# Patient Record
Sex: MALE | Race: WHITE | Employment: FULL TIME | URBAN - METROPOLITAN AREA
[De-identification: names, ages, dates, MRNs, and addresses within clinical notes are randomized per-mention and may not be internally consistent; named-entity substitution may affect disease eponyms.]

---

## 2023-08-29 PROBLEM — I49.3 PVCS (PREMATURE VENTRICULAR CONTRACTIONS): Status: ACTIVE | Noted: 2023-08-29

## 2023-08-29 PROBLEM — M25.562 LEFT KNEE PAIN: Status: ACTIVE | Noted: 2023-08-29

## 2023-08-29 PROBLEM — I10 HYPERTENSION: Status: ACTIVE | Noted: 2023-08-29

## 2023-08-29 PROBLEM — M19.90 ARTHRITIS: Status: ACTIVE | Noted: 2023-08-29

## 2023-08-29 PROBLEM — K21.9 ACID REFLUX: Status: ACTIVE | Noted: 2023-08-29

## 2023-08-29 PROBLEM — I82.90 VENOUS THROMBOSIS: Status: ACTIVE | Noted: 2023-08-29

## 2023-08-29 PROBLEM — I70.90 ASVD (ARTERIOSCLEROTIC VASCULAR DISEASE): Status: ACTIVE | Noted: 2023-08-29

## 2023-08-29 PROBLEM — C61 PROSTATE CANCER (MULTI): Status: ACTIVE | Noted: 2023-08-29

## 2023-08-29 PROBLEM — E78.5 HYPERLIPIDEMIA: Status: ACTIVE | Noted: 2023-08-29

## 2023-08-29 PROBLEM — R94.31 ABNORMAL EKG: Status: ACTIVE | Noted: 2023-08-29

## 2023-08-29 PROBLEM — M17.0 PRIMARY OSTEOARTHRITIS OF BOTH KNEES: Status: ACTIVE | Noted: 2023-08-29

## 2023-08-29 RX ORDER — FINASTERIDE 5 MG/1
1 TABLET, FILM COATED ORAL DAILY
COMMUNITY

## 2023-08-29 RX ORDER — HYDROCODONE BITARTRATE AND ACETAMINOPHEN 5; 325 MG/1; MG/1
TABLET ORAL EVERY 4 HOURS PRN
COMMUNITY
End: 2024-05-20 | Stop reason: ALTCHOICE

## 2023-08-29 RX ORDER — METOPROLOL TARTRATE 50 MG/1
0.5 TABLET ORAL 2 TIMES DAILY
COMMUNITY
Start: 2019-04-30

## 2023-08-29 RX ORDER — TAMSULOSIN HYDROCHLORIDE 0.4 MG/1
CAPSULE ORAL
COMMUNITY
Start: 2018-12-11 | End: 2024-05-20 | Stop reason: ALTCHOICE

## 2023-08-29 RX ORDER — ASPIRIN 81 MG/1
1 TABLET ORAL DAILY
COMMUNITY
Start: 2019-07-09

## 2023-08-29 RX ORDER — LIFITEGRAST 50 MG/ML
1 SOLUTION/ DROPS OPHTHALMIC 2 TIMES DAILY
COMMUNITY

## 2023-08-29 RX ORDER — VALACYCLOVIR HYDROCHLORIDE 500 MG/1
1 TABLET, FILM COATED ORAL DAILY
COMMUNITY
Start: 2019-05-18

## 2023-08-29 RX ORDER — ATORVASTATIN CALCIUM 40 MG/1
1 TABLET, FILM COATED ORAL DAILY
COMMUNITY
Start: 2019-07-09

## 2023-08-29 RX ORDER — ASPIRIN 325 MG
1 TABLET ORAL 2 TIMES DAILY
COMMUNITY
End: 2024-05-20 | Stop reason: ALTCHOICE

## 2023-08-29 RX ORDER — LOSARTAN POTASSIUM 25 MG/1
1 TABLET ORAL DAILY
COMMUNITY
Start: 2019-07-24

## 2023-08-29 RX ORDER — OMEPRAZOLE 20 MG/1
1 CAPSULE, DELAYED RELEASE ORAL DAILY
COMMUNITY
Start: 2018-11-25

## 2023-11-07 ENCOUNTER — OFFICE VISIT (OUTPATIENT)
Dept: CARDIOLOGY | Facility: CLINIC | Age: 73
End: 2023-11-07
Payer: COMMERCIAL

## 2023-11-07 VITALS
SYSTOLIC BLOOD PRESSURE: 128 MMHG | BODY MASS INDEX: 32.54 KG/M2 | DIASTOLIC BLOOD PRESSURE: 74 MMHG | HEART RATE: 69 BPM | OXYGEN SATURATION: 96 % | WEIGHT: 214 LBS

## 2023-11-07 DIAGNOSIS — I70.90 ASVD (ARTERIOSCLEROTIC VASCULAR DISEASE): ICD-10-CM

## 2023-11-07 DIAGNOSIS — I10 PRIMARY HYPERTENSION: Primary | ICD-10-CM

## 2023-11-07 DIAGNOSIS — E78.49 OTHER HYPERLIPIDEMIA: ICD-10-CM

## 2023-11-07 PROCEDURE — 1126F AMNT PAIN NOTED NONE PRSNT: CPT | Performed by: INTERNAL MEDICINE

## 2023-11-07 PROCEDURE — 99214 OFFICE O/P EST MOD 30 MIN: CPT | Performed by: INTERNAL MEDICINE

## 2023-11-07 PROCEDURE — 3078F DIAST BP <80 MM HG: CPT | Performed by: INTERNAL MEDICINE

## 2023-11-07 PROCEDURE — 1159F MED LIST DOCD IN RCRD: CPT | Performed by: INTERNAL MEDICINE

## 2023-11-07 PROCEDURE — 3074F SYST BP LT 130 MM HG: CPT | Performed by: INTERNAL MEDICINE

## 2023-11-07 RX ORDER — LEVOTHYROXINE SODIUM 25 UG/1
25 TABLET ORAL
COMMUNITY

## 2023-11-07 ASSESSMENT — ENCOUNTER SYMPTOMS
NEAR-SYNCOPE: 0
WEAKNESS: 0
SYNCOPE: 0
DEPRESSION: 0
OCCASIONAL FEELINGS OF UNSTEADINESS: 0
WEIGHT LOSS: 0
LOSS OF SENSATION IN FEET: 0
SHORTNESS OF BREATH: 0
PALPITATIONS: 0
DYSPNEA ON EXERTION: 0
WEIGHT GAIN: 0
IRREGULAR HEARTBEAT: 0
MYALGIAS: 0
COUGH: 0
PND: 0
DIAPHORESIS: 0
ORTHOPNEA: 0
WHEEZING: 0
FEVER: 0
CLAUDICATION: 0
DIZZINESS: 0

## 2023-11-07 ASSESSMENT — PAIN SCALES - GENERAL: PAINLEVEL: 0-NO PAIN

## 2023-11-07 NOTE — PROGRESS NOTES
Subjective      Chief Complaint   Patient presents with    Follow-up        73-year-old male with a history of presumed atherosclerotic heart disease. He presents for his 6 month follow-up. He has been doing well. He does not exercise. He denies limitations. He still works 8-4 Mon-Fri.         Review of Systems   Constitutional: Negative for diaphoresis, fever, weight gain and weight loss.   Eyes:  Negative for visual disturbance.   Cardiovascular:  Negative for chest pain, claudication, dyspnea on exertion, irregular heartbeat, leg swelling, near-syncope, orthopnea, palpitations, paroxysmal nocturnal dyspnea and syncope.   Respiratory:  Negative for cough, shortness of breath and wheezing.    Musculoskeletal:  Negative for muscle weakness and myalgias.   Neurological:  Negative for dizziness and weakness.   All other systems reviewed and are negative.       No past medical history on file.     Past Surgical History:   Procedure Laterality Date    OTHER SURGICAL HISTORY  07/02/2019    Appendectomy    OTHER SURGICAL HISTORY  07/02/2019    Knee surgery    OTHER SURGICAL HISTORY  08/20/2019    Cataract surgery        Social History     Socioeconomic History    Marital status:      Spouse name: Not on file    Number of children: Not on file    Years of education: Not on file    Highest education level: Not on file   Occupational History    Not on file   Tobacco Use    Smoking status: Former     Types: Cigarettes    Smokeless tobacco: Not on file   Substance and Sexual Activity    Alcohol use: Not on file    Drug use: Not on file    Sexual activity: Not on file   Other Topics Concern    Not on file   Social History Narrative    Not on file     Social Determinants of Health     Financial Resource Strain: Not on file   Food Insecurity: Not on file   Transportation Needs: Not on file   Physical Activity: Not on file   Stress: Not on file   Social Connections: Not on file   Intimate Partner Violence: Not on file    Housing Stability: Not on file        Family History   Problem Relation Name Age of Onset    Other (Old Age) Mother      Heart disease Father          OBJECTIVE:    Vitals:    11/07/23 0900   BP: 128/74   Pulse: 69   SpO2: 96%        Vitals reviewed.   Constitutional:       Appearance: Normal and healthy appearance. Not in distress.   Pulmonary:      Effort: Pulmonary effort is normal.      Breath sounds: Normal breath sounds.   Cardiovascular:      Normal rate. Regular rhythm. Normal S1. Normal S2.       Murmurs: There is no murmur.      No gallop.  No click.   Pulses:     Intact distal pulses.   Edema:     Peripheral edema absent.   Skin:     General: Skin is warm and dry.   Neurological:      General: No focal deficit present.          Lab Review:   Lab Results   Component Value Date     02/18/2020    K 4.3 02/18/2020     (H) 02/18/2020    CO2 21 (L) 02/18/2020    BUN 14 02/18/2020    CREATININE 1.0 02/18/2020    GLUCOSE 137 (H) 02/18/2020    CALCIUM 8.3 (L) 02/18/2020     Lab Results   Component Value Date    WBC 11.4 (H) 02/18/2020    HGB 13.6 02/18/2020    HCT 40.6 (L) 02/18/2020    MCV 97.8 02/18/2020     02/18/2020     Lab Results   Component Value Date    CHOL 119 (L) 12/02/2020    TRIG 92 12/02/2020    HDL 61 12/02/2020       Lab Results   Component Value Date    LDLCALC 40 (L) 12/02/2020        Hypertension  Controlled. Continue medical therapy    Hyperlipidemia  Lipids checked with his [facility] PCP. LDL in April 2023 was below goal. Continue statin.    ASVD (arteriosclerotic vascular disease)  Stable without angina. Continue statin

## 2024-05-20 ENCOUNTER — OFFICE VISIT (OUTPATIENT)
Dept: CARDIOLOGY | Facility: CLINIC | Age: 74
End: 2024-05-20
Payer: COMMERCIAL

## 2024-05-20 VITALS
HEART RATE: 75 BPM | WEIGHT: 209 LBS | DIASTOLIC BLOOD PRESSURE: 82 MMHG | BODY MASS INDEX: 31.78 KG/M2 | OXYGEN SATURATION: 97 % | SYSTOLIC BLOOD PRESSURE: 146 MMHG

## 2024-05-20 DIAGNOSIS — I70.90 ASVD (ARTERIOSCLEROTIC VASCULAR DISEASE): Primary | ICD-10-CM

## 2024-05-20 DIAGNOSIS — I10 PRIMARY HYPERTENSION: ICD-10-CM

## 2024-05-20 DIAGNOSIS — E78.49 OTHER HYPERLIPIDEMIA: ICD-10-CM

## 2024-05-20 PROCEDURE — 99214 OFFICE O/P EST MOD 30 MIN: CPT | Performed by: INTERNAL MEDICINE

## 2024-05-20 PROCEDURE — 1159F MED LIST DOCD IN RCRD: CPT | Performed by: INTERNAL MEDICINE

## 2024-05-20 PROCEDURE — 3079F DIAST BP 80-89 MM HG: CPT | Performed by: INTERNAL MEDICINE

## 2024-05-20 PROCEDURE — 1126F AMNT PAIN NOTED NONE PRSNT: CPT | Performed by: INTERNAL MEDICINE

## 2024-05-20 PROCEDURE — 3077F SYST BP >= 140 MM HG: CPT | Performed by: INTERNAL MEDICINE

## 2024-05-20 RX ORDER — LORATADINE 10 MG
10 TABLET,DISINTEGRATING ORAL DAILY
COMMUNITY

## 2024-05-20 ASSESSMENT — ENCOUNTER SYMPTOMS
DIAPHORESIS: 0
SHORTNESS OF BREATH: 0
CLAUDICATION: 0
PALPITATIONS: 0
IRREGULAR HEARTBEAT: 0
WEIGHT GAIN: 0
WEIGHT LOSS: 0
WHEEZING: 0
ORTHOPNEA: 0
SYNCOPE: 0
MYALGIAS: 0
DIZZINESS: 0
FEVER: 0
PND: 0
COUGH: 0
WEAKNESS: 0
NEAR-SYNCOPE: 0
DYSPNEA ON EXERTION: 0

## 2024-05-20 ASSESSMENT — PAIN SCALES - GENERAL: PAINLEVEL: 0-NO PAIN

## 2024-05-20 NOTE — ASSESSMENT & PLAN NOTE
Uncontrolled. Forgot to take his medications today. He will take when he gets home. Encouraged to check BP 2-3x/week at home

## 2024-05-20 NOTE — PROGRESS NOTES
Subjective      Chief Complaint   Patient presents with    Follow-up     Bp did not take medication today         73-year-old male with a history of presumed atherosclerotic heart disease. He presents for his 6 month follow-up. He has been doing well, no issues with dyspnea or chest pain. He has significant right knee OA and will be having replacement this summer.          Review of Systems   Constitutional: Negative for diaphoresis, fever, weight gain and weight loss.   Eyes:  Negative for visual disturbance.   Cardiovascular:  Negative for chest pain, claudication, dyspnea on exertion, irregular heartbeat, leg swelling, near-syncope, orthopnea, palpitations, paroxysmal nocturnal dyspnea and syncope.   Respiratory:  Negative for cough, shortness of breath and wheezing.    Musculoskeletal:  Negative for muscle weakness and myalgias.   Neurological:  Negative for dizziness and weakness.   All other systems reviewed and are negative.       Past Medical History:   Diagnosis Date    ASVD (arteriosclerotic vascular disease)     Hyperlipidemia     Hypertension     PVC (premature ventricular contraction)         Past Surgical History:   Procedure Laterality Date    OTHER SURGICAL HISTORY  07/02/2019    Appendectomy    OTHER SURGICAL HISTORY  07/02/2019    Knee surgery    OTHER SURGICAL HISTORY  08/20/2019    Cataract surgery        Social History     Socioeconomic History    Marital status:      Spouse name: Not on file    Number of children: Not on file    Years of education: Not on file    Highest education level: Not on file   Occupational History    Not on file   Tobacco Use    Smoking status: Former     Types: Cigarettes    Smokeless tobacco: Not on file   Substance and Sexual Activity    Alcohol use: Yes    Drug use: Not Currently    Sexual activity: Not on file   Other Topics Concern    Not on file   Social History Narrative    Not on file     Social Determinants of Health     Financial Resource Strain: Not  on file   Food Insecurity: Not on file   Transportation Needs: Not on file   Physical Activity: Not on file   Stress: Not on file   Social Connections: Not on file   Intimate Partner Violence: Not on file   Housing Stability: Not on file        Family History   Problem Relation Name Age of Onset    Other (Old Age) Mother      Heart disease Father          OBJECTIVE:    Vitals:    05/20/24 0924   BP: 146/82   Pulse: 75   SpO2: 97%        Vitals reviewed.   Constitutional:       Appearance: Normal and healthy appearance. Not in distress.   Pulmonary:      Effort: Pulmonary effort is normal.      Breath sounds: Normal breath sounds.   Cardiovascular:      Normal rate. Regular rhythm. Normal S1. Normal S2.       Murmurs: There is no murmur.      No gallop.  No click.   Pulses:     Intact distal pulses.   Edema:     Peripheral edema absent.   Skin:     General: Skin is warm and dry.   Neurological:      General: No focal deficit present.          Lab Review:   Lab Results   Component Value Date     02/18/2020    K 4.3 02/18/2020     (H) 02/18/2020    CO2 21 (L) 02/18/2020    BUN 14 02/18/2020    CREATININE 1.0 02/18/2020    GLUCOSE 137 (H) 02/18/2020    CALCIUM 8.3 (L) 02/18/2020     Lab Results   Component Value Date    CHOL 119 (L) 12/02/2020    TRIG 92 12/02/2020    HDL 61 12/02/2020       Lab Results   Component Value Date    LDLCALC 40 (L) 12/02/2020        ASVD (arteriosclerotic vascular disease)  Stable without angina. Lipids checked with his PCP, goal LDL is <70. Continue statin, asa    Hypertension  Uncontrolled. Forgot to take his medications today. He will take when he gets home. Encouraged to check BP 2-3x/week at home

## 2024-06-27 ENCOUNTER — TELEPHONE (OUTPATIENT)
Dept: CARDIOLOGY | Facility: CLINIC | Age: 74
End: 2024-06-27
Payer: COMMERCIAL

## 2024-08-01 ENCOUNTER — OFFICE VISIT (OUTPATIENT)
Dept: CARDIOLOGY | Facility: CLINIC | Age: 74
End: 2024-08-01
Payer: COMMERCIAL

## 2024-08-01 VITALS
BODY MASS INDEX: 30.56 KG/M2 | OXYGEN SATURATION: 97 % | SYSTOLIC BLOOD PRESSURE: 128 MMHG | HEART RATE: 87 BPM | DIASTOLIC BLOOD PRESSURE: 78 MMHG | WEIGHT: 201 LBS

## 2024-08-01 DIAGNOSIS — I10 PRIMARY HYPERTENSION: ICD-10-CM

## 2024-08-01 DIAGNOSIS — I70.90 ASVD (ARTERIOSCLEROTIC VASCULAR DISEASE): Primary | ICD-10-CM

## 2024-08-01 DIAGNOSIS — I44.7 LBBB (LEFT BUNDLE BRANCH BLOCK): ICD-10-CM

## 2024-08-01 PROCEDURE — 99214 OFFICE O/P EST MOD 30 MIN: CPT | Performed by: INTERNAL MEDICINE

## 2024-08-01 PROCEDURE — 1125F AMNT PAIN NOTED PAIN PRSNT: CPT | Performed by: INTERNAL MEDICINE

## 2024-08-01 PROCEDURE — 3078F DIAST BP <80 MM HG: CPT | Performed by: INTERNAL MEDICINE

## 2024-08-01 PROCEDURE — 3074F SYST BP LT 130 MM HG: CPT | Performed by: INTERNAL MEDICINE

## 2024-08-01 PROCEDURE — 1159F MED LIST DOCD IN RCRD: CPT | Performed by: INTERNAL MEDICINE

## 2024-08-01 ASSESSMENT — ENCOUNTER SYMPTOMS
OCCASIONAL FEELINGS OF UNSTEADINESS: 0
DIZZINESS: 0
WEIGHT GAIN: 0
ORTHOPNEA: 0
SYNCOPE: 0
WEAKNESS: 0
DEPRESSION: 0
WHEEZING: 0
LOSS OF SENSATION IN FEET: 0
DIAPHORESIS: 0
MYALGIAS: 0
CLAUDICATION: 0
IRREGULAR HEARTBEAT: 0
WEIGHT LOSS: 0
COUGH: 0
NEAR-SYNCOPE: 0
PND: 0
PALPITATIONS: 0
FEVER: 0
SHORTNESS OF BREATH: 0
DYSPNEA ON EXERTION: 0

## 2024-08-01 ASSESSMENT — PATIENT HEALTH QUESTIONNAIRE - PHQ9
2. FEELING DOWN, DEPRESSED OR HOPELESS: NOT AT ALL
1. LITTLE INTEREST OR PLEASURE IN DOING THINGS: NOT AT ALL
SUM OF ALL RESPONSES TO PHQ9 QUESTIONS 1 AND 2: 0

## 2024-08-01 ASSESSMENT — PAIN SCALES - GENERAL: PAINLEVEL: 3

## 2024-08-01 NOTE — PROGRESS NOTES
Subjective      Chief Complaint   Patient presents with    Follow-up        73-year-old male with a history of presumed atherosclerotic heart disease. He presents for his 6 month follow-up. He has been doing well, no issues with dyspnea or chest pain. He has significant right knee OA and will be having replacement this summer. He had his right knee replaced this month. Prior to the sgy he had an ECG which showed LBBB. He was advised to followup. He is going through rehab for his knee, has no chest pain or dyspnea. He has no LE swelling         Review of Systems   Constitutional: Negative for diaphoresis, fever, weight gain and weight loss.   Eyes:  Negative for visual disturbance.   Cardiovascular:  Negative for chest pain, claudication, dyspnea on exertion, irregular heartbeat, leg swelling, near-syncope, orthopnea, palpitations, paroxysmal nocturnal dyspnea and syncope.   Respiratory:  Negative for cough, shortness of breath and wheezing.    Musculoskeletal:  Negative for muscle weakness and myalgias.   Neurological:  Negative for dizziness and weakness.   All other systems reviewed and are negative.       Past Medical History:   Diagnosis Date    ASVD (arteriosclerotic vascular disease)     Hyperlipidemia     Hypertension     PVC (premature ventricular contraction)         Past Surgical History:   Procedure Laterality Date    OTHER SURGICAL HISTORY  07/02/2019    Appendectomy    OTHER SURGICAL HISTORY  07/02/2019    Knee surgery    OTHER SURGICAL HISTORY  08/20/2019    Cataract surgery        Social History     Socioeconomic History    Marital status:      Spouse name: Not on file    Number of children: Not on file    Years of education: Not on file    Highest education level: Not on file   Occupational History    Not on file   Tobacco Use    Smoking status: Former     Types: Cigarettes    Smokeless tobacco: Not on file   Substance and Sexual Activity    Alcohol use: Yes    Drug use: Not Currently     Sexual activity: Defer   Other Topics Concern    Not on file   Social History Narrative    Not on file     Social Determinants of Health     Financial Resource Strain: Not on file   Food Insecurity: Not on file   Transportation Needs: Not on file   Physical Activity: Not on file   Stress: Not on file   Social Connections: Not on file   Intimate Partner Violence: Not on file   Housing Stability: Not on file        Family History   Problem Relation Name Age of Onset    Other (Old Age) Mother      Heart disease Father          OBJECTIVE:    Vitals:    08/01/24 1305   BP: 128/78   Pulse: 87   SpO2: 97%        Vitals reviewed.   Constitutional:       Appearance: Normal and healthy appearance. Not in distress.   Pulmonary:      Effort: Pulmonary effort is normal.      Breath sounds: Normal breath sounds.   Cardiovascular:      Normal rate. Regular rhythm. Normal S1. Normal S2.       Murmurs: There is no murmur.      No gallop.  No click.   Pulses:     Intact distal pulses.   Edema:     Peripheral edema absent.   Skin:     General: Skin is warm and dry.   Neurological:      General: No focal deficit present.          Lab Review:   Lab Results   Component Value Date     02/18/2020    K 4.3 02/18/2020     (H) 02/18/2020    CO2 21 (L) 02/18/2020    BUN 14 02/18/2020    CREATININE 1.0 02/18/2020    GLUCOSE 137 (H) 02/18/2020    CALCIUM 8.3 (L) 02/18/2020     Lab Results   Component Value Date    CHOL 119 (L) 12/02/2020    TRIG 92 12/02/2020    HDL 61 12/02/2020       Lab Results   Component Value Date    LDLCALC 40 (L) 12/02/2020        ASVD (arteriosclerotic vascular disease)  Stable without angina. Lipids checked with his PCP. Goal LDL is <70.     LBBB (left bundle branch block)  Normal CV exam, no functional limitations. Will check an echocardiogram    Hypertension  Controlled. Checks at home 2-3x/week

## 2024-08-27 ENCOUNTER — HOSPITAL ENCOUNTER (OUTPATIENT)
Dept: CARDIOLOGY | Facility: HOSPITAL | Age: 74
Discharge: HOME | End: 2024-08-27
Payer: COMMERCIAL

## 2024-08-27 DIAGNOSIS — I44.7 LBBB (LEFT BUNDLE BRANCH BLOCK): ICD-10-CM

## 2024-08-27 LAB
AORTIC VALVE MEAN GRADIENT: 3 MMHG
AORTIC VALVE PEAK VELOCITY: 1.11 M/S
AV PEAK GRADIENT: 4.9 MMHG
AVA (PEAK VEL): 2.55 CM2
AVA (VTI): 2.64 CM2
EJECTION FRACTION APICAL 4 CHAMBER: 40.3
EJECTION FRACTION: 43 %
LEFT ATRIUM VOLUME AREA LENGTH INDEX BSA: 28 ML/M2
LEFT VENTRICLE INTERNAL DIMENSION DIASTOLE: 4.69 CM (ref 3.5–6)
LEFT VENTRICULAR OUTFLOW TRACT DIAMETER: 2.1 CM
MITRAL VALVE E/A RATIO: 0.87
RIGHT VENTRICLE FREE WALL PEAK S': 8.7 CM/S
TRICUSPID ANNULAR PLANE SYSTOLIC EXCURSION: 2.1 CM

## 2024-08-27 PROCEDURE — 2500000004 HC RX 250 GENERAL PHARMACY W/ HCPCS (ALT 636 FOR OP/ED): Performed by: INTERNAL MEDICINE

## 2024-08-27 PROCEDURE — 93306 TTE W/DOPPLER COMPLETE: CPT

## 2024-08-27 PROCEDURE — 93306 TTE W/DOPPLER COMPLETE: CPT | Performed by: INTERNAL MEDICINE

## 2024-09-04 ENCOUNTER — TELEPHONE (OUTPATIENT)
Dept: CARDIOLOGY | Facility: CLINIC | Age: 74
End: 2024-09-04
Payer: COMMERCIAL

## 2024-09-04 ENCOUNTER — TELEPHONE (OUTPATIENT)
Dept: CARDIOLOGY | Facility: HOSPITAL | Age: 74
End: 2024-09-04
Payer: COMMERCIAL

## 2024-09-04 DIAGNOSIS — I51.9 LEFT VENTRICULAR SYSTOLIC DYSFUNCTION: Primary | ICD-10-CM

## 2024-09-04 RX ORDER — METOPROLOL TARTRATE 25 MG/1
50 TABLET, FILM COATED ORAL ONCE
Qty: 2 TABLET | Refills: 0 | Status: SHIPPED | OUTPATIENT
Start: 2024-09-04 | End: 2024-09-04

## 2024-09-04 NOTE — TELEPHONE ENCOUNTER
Called to discuss echo results with him. He has systolic dysfunction which is new. I feel it is time to definitively rule in or our ASHD and subsequent ischemic cardiomyopathy

## 2024-09-05 ENCOUNTER — TELEPHONE (OUTPATIENT)
Dept: CARDIOLOGY | Facility: CLINIC | Age: 74
End: 2024-09-05
Payer: COMMERCIAL

## 2024-09-05 DIAGNOSIS — I10 PRIMARY HYPERTENSION: Primary | ICD-10-CM

## 2024-09-05 NOTE — TELEPHONE ENCOUNTER
Pt called the office stated he was advice he needed a lab order place in epic pior to getting CT angio coronary art with heartflow if score >30%   Please advice   Good call back number  [phone]

## 2024-09-05 NOTE — TELEPHONE ENCOUNTER
----- Message from Gavino Rodriguez sent at 9/4/2024  4:20 PM EDT -----  I put in a order for coronary CT for him. Can you get him the number for centralized scheduling?  ----- Message -----  From: Joel, Syngo - Cardiology Results In  Sent: 8/27/2024   3:52 PM EDT  To: Gavino oRdriguez, DO  
Called patient, provided phone number for scheduling [phone]. Patient will call and schedule CT  
normal sinus rhythm

## 2024-09-13 ENCOUNTER — LAB (OUTPATIENT)
Dept: LAB | Facility: LAB | Age: 74
End: 2024-09-13
Payer: COMMERCIAL

## 2024-09-13 DIAGNOSIS — I10 PRIMARY HYPERTENSION: ICD-10-CM

## 2024-09-13 LAB
ANION GAP SERPL CALC-SCNC: 11 MMOL/L (ref 10–20)
BUN SERPL-MCNC: 12 MG/DL (ref 6–23)
CALCIUM SERPL-MCNC: 9.1 MG/DL (ref 8.6–10.3)
CHLORIDE SERPL-SCNC: 102 MMOL/L (ref 98–107)
CO2 SERPL-SCNC: 29 MMOL/L (ref 21–32)
CREAT SERPL-MCNC: 1.12 MG/DL (ref 0.5–1.3)
EGFRCR SERPLBLD CKD-EPI 2021: 68 ML/MIN/1.73M*2
GLUCOSE SERPL-MCNC: 86 MG/DL (ref 74–99)
POTASSIUM SERPL-SCNC: 4.1 MMOL/L (ref 3.5–5.3)
SODIUM SERPL-SCNC: 138 MMOL/L (ref 136–145)

## 2024-09-13 PROCEDURE — 36415 COLL VENOUS BLD VENIPUNCTURE: CPT

## 2024-09-13 PROCEDURE — 80048 BASIC METABOLIC PNL TOTAL CA: CPT

## 2024-09-25 ENCOUNTER — HOSPITAL ENCOUNTER (OUTPATIENT)
Dept: RADIOLOGY | Facility: HOSPITAL | Age: 74
Discharge: HOME | End: 2024-09-25
Payer: COMMERCIAL

## 2024-09-25 VITALS
HEART RATE: 66 BPM | SYSTOLIC BLOOD PRESSURE: 112 MMHG | OXYGEN SATURATION: 96 % | DIASTOLIC BLOOD PRESSURE: 67 MMHG | RESPIRATION RATE: 18 BRPM

## 2024-09-25 DIAGNOSIS — I51.9 LEFT VENTRICULAR SYSTOLIC DYSFUNCTION: ICD-10-CM

## 2024-09-25 PROCEDURE — 75574 CT ANGIO HRT W/3D IMAGE: CPT

## 2024-09-25 PROCEDURE — 75574 CT ANGIO HRT W/3D IMAGE: CPT | Performed by: RADIOLOGY

## 2024-09-25 PROCEDURE — 2550000001 HC RX 255 CONTRASTS: Performed by: INTERNAL MEDICINE

## 2024-09-25 PROCEDURE — 2500000005 HC RX 250 GENERAL PHARMACY W/O HCPCS: Performed by: RADIOLOGY

## 2024-09-25 PROCEDURE — 2500000001 HC RX 250 WO HCPCS SELF ADMINISTERED DRUGS (ALT 637 FOR MEDICARE OP): Performed by: RADIOLOGY

## 2024-09-25 RX ORDER — METOPROLOL TARTRATE 100 MG/1
100 TABLET ORAL ONCE
Status: COMPLETED | OUTPATIENT
Start: 2024-09-25 | End: 2024-09-25

## 2024-09-25 RX ORDER — METOPROLOL TARTRATE 1 MG/ML
5 INJECTION, SOLUTION INTRAVENOUS ONCE AS NEEDED
Status: COMPLETED | OUTPATIENT
Start: 2024-09-25 | End: 2024-09-25

## 2024-09-25 RX ORDER — LORAZEPAM 2 MG/ML
0.5 INJECTION INTRAMUSCULAR EVERY 5 MIN PRN
Status: DISCONTINUED | OUTPATIENT
Start: 2024-09-25 | End: 2024-09-26 | Stop reason: HOSPADM

## 2024-09-25 RX ORDER — METOPROLOL TARTRATE 1 MG/ML
5 INJECTION, SOLUTION INTRAVENOUS ONCE AS NEEDED
Status: DISCONTINUED | OUTPATIENT
Start: 2024-09-25 | End: 2024-09-26 | Stop reason: HOSPADM

## 2024-09-25 RX ORDER — METOPROLOL TARTRATE 100 MG/1
100 TABLET ORAL ONCE AS NEEDED
Status: DISCONTINUED | OUTPATIENT
Start: 2024-09-25 | End: 2024-09-26 | Stop reason: HOSPADM

## 2024-09-25 RX ORDER — NITROGLYCERIN 0.4 MG/1
0.8 TABLET SUBLINGUAL ONCE
Status: COMPLETED | OUTPATIENT
Start: 2024-09-25 | End: 2024-09-25

## 2024-09-25 RX ORDER — METOPROLOL TARTRATE 1 MG/ML
5 INJECTION, SOLUTION INTRAVENOUS ONCE
Status: COMPLETED | OUTPATIENT
Start: 2024-09-25 | End: 2024-09-25

## 2024-09-25 NOTE — NURSING NOTE
HR elevated above recommended for scan patient has received PO and IV metoprolol at the direction of Dr. Hernandez per protocol

## 2024-09-25 NOTE — NURSING NOTE
Patient tolerated procedure with no adverse reactions, advised no metoprolol for today. Patient IV removed and left without difficulties

## 2024-10-07 ENCOUNTER — OFFICE VISIT (OUTPATIENT)
Dept: CARDIOLOGY | Facility: CLINIC | Age: 74
End: 2024-10-07
Payer: COMMERCIAL

## 2024-10-07 VITALS
WEIGHT: 199.4 LBS | DIASTOLIC BLOOD PRESSURE: 68 MMHG | BODY MASS INDEX: 30.32 KG/M2 | SYSTOLIC BLOOD PRESSURE: 120 MMHG | HEART RATE: 71 BPM | OXYGEN SATURATION: 98 %

## 2024-10-07 DIAGNOSIS — E78.49 OTHER HYPERLIPIDEMIA: ICD-10-CM

## 2024-10-07 DIAGNOSIS — I70.90 ASVD (ARTERIOSCLEROTIC VASCULAR DISEASE): Primary | ICD-10-CM

## 2024-10-07 DIAGNOSIS — I44.7 LBBB (LEFT BUNDLE BRANCH BLOCK): ICD-10-CM

## 2024-10-07 DIAGNOSIS — I10 PRIMARY HYPERTENSION: ICD-10-CM

## 2024-10-07 PROCEDURE — 99213 OFFICE O/P EST LOW 20 MIN: CPT | Performed by: INTERNAL MEDICINE

## 2024-10-07 PROCEDURE — 3074F SYST BP LT 130 MM HG: CPT | Performed by: INTERNAL MEDICINE

## 2024-10-07 PROCEDURE — 1126F AMNT PAIN NOTED NONE PRSNT: CPT | Performed by: INTERNAL MEDICINE

## 2024-10-07 PROCEDURE — 1159F MED LIST DOCD IN RCRD: CPT | Performed by: INTERNAL MEDICINE

## 2024-10-07 PROCEDURE — 3078F DIAST BP <80 MM HG: CPT | Performed by: INTERNAL MEDICINE

## 2024-10-07 ASSESSMENT — ENCOUNTER SYMPTOMS
LOSS OF SENSATION IN FEET: 0
OCCASIONAL FEELINGS OF UNSTEADINESS: 0
DEPRESSION: 0

## 2024-10-07 ASSESSMENT — PATIENT HEALTH QUESTIONNAIRE - PHQ9
1. LITTLE INTEREST OR PLEASURE IN DOING THINGS: NOT AT ALL
SUM OF ALL RESPONSES TO PHQ9 QUESTIONS 1 AND 2: 0
2. FEELING DOWN, DEPRESSED OR HOPELESS: NOT AT ALL

## 2024-10-07 ASSESSMENT — PAIN SCALES - GENERAL: PAINLEVEL: 0-NO PAIN

## 2024-10-07 NOTE — PROGRESS NOTES
Subjective      Chief Complaint   Patient presents with    Follow-up        74-year-old male with a history of presumed atherosclerotic heart disease. He presents for his 6 month follow-up.  When I saw him last we arranged for an echocardiogram which showed an ejection fraction of 40 to 45%.  This was a new finding.  We elected to perform coronary CT which identified CT FFR significant stenosis in the second diagonal.  There were lesions of 50 to 70% in his mid LAD and proximal to mid RCA all of which were CT FFR negative. He is still doing PT for his knee, he has no chest pain or dyspnea.          ROS     Past Medical History:   Diagnosis Date    ASVD (arteriosclerotic vascular disease)     Hyperlipidemia     Hypertension     PVC (premature ventricular contraction)         Past Surgical History:   Procedure Laterality Date    OTHER SURGICAL HISTORY  07/02/2019    Appendectomy    OTHER SURGICAL HISTORY  07/02/2019    Knee surgery    OTHER SURGICAL HISTORY  08/20/2019    Cataract surgery        Social History     Socioeconomic History    Marital status:      Spouse name: Not on file    Number of children: Not on file    Years of education: Not on file    Highest education level: Not on file   Occupational History    Not on file   Tobacco Use    Smoking status: Former     Types: Cigarettes    Smokeless tobacco: Not on file   Substance and Sexual Activity    Alcohol use: Yes    Drug use: Not Currently    Sexual activity: Defer   Other Topics Concern    Not on file   Social History Narrative    Not on file     Social Determinants of Health     Financial Resource Strain: Not on file   Food Insecurity: Not on file   Transportation Needs: Not on file   Physical Activity: Not on file   Stress: Not on file   Social Connections: Not on file   Intimate Partner Violence: Not on file   Housing Stability: Not on file        Family History   Problem Relation Name Age of Onset    Other (Old Age) Mother      Heart disease  Father          OBJECTIVE:    Vitals:    10/07/24 1015   BP: 120/68   Pulse: 71   SpO2: 98%        Physical Exam     Lab Review:   Lab Results   Component Value Date     09/13/2024    K 4.1 09/13/2024     09/13/2024    CO2 29 09/13/2024    BUN 12 09/13/2024    CREATININE 1.12 09/13/2024    GLUCOSE 86 09/13/2024    CALCIUM 9.1 09/13/2024     Lab Results   Component Value Date    CHOL 119 (L) 12/02/2020    TRIG 92 12/02/2020    HDL 61 12/02/2020       Lab Results   Component Value Date    LDLCALC 40 (L) 12/02/2020        ASVD (arteriosclerotic vascular disease)  Coronary CT with FFR demonstrates functionally significant ASHD is a second diagonal, Functionally insignificant disease otherwise. He is asymptomatic. Will continue with medical therapy as he will not likely benefit from percutaneous intervention over conservative/medical management    Hypertension  Controlled. Continue current medical therapy

## 2024-10-07 NOTE — ASSESSMENT & PLAN NOTE
Coronary CT with FFR demonstrates functionally significant ASHD is a second diagonal, Functionally insignificant disease otherwise. He is asymptomatic. Will continue with medical therapy as he will not likely benefit from percutaneous intervention over conservative/medical management

## 2025-04-07 ENCOUNTER — APPOINTMENT (OUTPATIENT)
Dept: CARDIOLOGY | Facility: CLINIC | Age: 75
End: 2025-04-07
Payer: COMMERCIAL

## 2025-04-15 ENCOUNTER — OFFICE VISIT (OUTPATIENT)
Facility: CLINIC | Age: 75
End: 2025-04-15
Payer: COMMERCIAL

## 2025-04-15 VITALS
HEART RATE: 74 BPM | OXYGEN SATURATION: 95 % | BODY MASS INDEX: 31.93 KG/M2 | SYSTOLIC BLOOD PRESSURE: 138 MMHG | WEIGHT: 210 LBS | DIASTOLIC BLOOD PRESSURE: 66 MMHG

## 2025-04-15 DIAGNOSIS — I10 PRIMARY HYPERTENSION: ICD-10-CM

## 2025-04-15 DIAGNOSIS — I70.90 ASVD (ARTERIOSCLEROTIC VASCULAR DISEASE): Primary | ICD-10-CM

## 2025-04-15 PROCEDURE — 1126F AMNT PAIN NOTED NONE PRSNT: CPT | Performed by: INTERNAL MEDICINE

## 2025-04-15 PROCEDURE — 99213 OFFICE O/P EST LOW 20 MIN: CPT | Performed by: INTERNAL MEDICINE

## 2025-04-15 PROCEDURE — 3075F SYST BP GE 130 - 139MM HG: CPT | Performed by: INTERNAL MEDICINE

## 2025-04-15 PROCEDURE — 1159F MED LIST DOCD IN RCRD: CPT | Performed by: INTERNAL MEDICINE

## 2025-04-15 PROCEDURE — 3078F DIAST BP <80 MM HG: CPT | Performed by: INTERNAL MEDICINE

## 2025-04-15 ASSESSMENT — LIFESTYLE VARIABLES: TOTAL SCORE: 0

## 2025-04-15 ASSESSMENT — ENCOUNTER SYMPTOMS
OCCASIONAL FEELINGS OF UNSTEADINESS: 0
MYALGIAS: 0
NEAR-SYNCOPE: 0
DIAPHORESIS: 0
CLAUDICATION: 0
DEPRESSION: 0
PALPITATIONS: 0
FEVER: 0
SHORTNESS OF BREATH: 0
PND: 0
COUGH: 0
ORTHOPNEA: 0
LOSS OF SENSATION IN FEET: 0
DYSPNEA ON EXERTION: 0
WEAKNESS: 0
WEIGHT GAIN: 0
WHEEZING: 0
WEIGHT LOSS: 0
IRREGULAR HEARTBEAT: 0
DIZZINESS: 0
SYNCOPE: 0

## 2025-04-15 ASSESSMENT — PATIENT HEALTH QUESTIONNAIRE - PHQ9
1. LITTLE INTEREST OR PLEASURE IN DOING THINGS: NOT AT ALL
2. FEELING DOWN, DEPRESSED OR HOPELESS: NOT AT ALL
SUM OF ALL RESPONSES TO PHQ9 QUESTIONS 1 AND 2: 0

## 2025-04-15 ASSESSMENT — PAIN SCALES - GENERAL: PAINLEVEL_OUTOF10: 0-NO PAIN

## 2025-04-15 NOTE — ASSESSMENT & PLAN NOTE
Stable without angina. Continue asa, high potency statin. Lipids checked with his PCP, goal LDL is <70.

## 2025-04-15 NOTE — ASSESSMENT & PLAN NOTE
Controlled. Continue losartan. Advised to check at home 2-3x/week. Lifestyle modifications were discussed. I advocated for mediterranean and plant based eating. We also discussed exercise. 150 minutes a week of moderate intensity exercise is recommended per our ACC/AHA guidelines

## 2025-04-15 NOTE — PROGRESS NOTES
Subjective      Chief Complaint   Patient presents with    6 month f/u        74-year-old male with a history of presumed atherosclerotic heart disease. He presents for his 6 month follow-up.  When I saw him last we arranged for an echocardiogram which showed an ejection fraction of 40 to 45%.  This was a new finding.  We elected to perform coronary CT which identified CT FFR significant stenosis in the second diagonal.  There were lesions of 50 to 70% in his mid LAD and proximal to mid RCA all of which were CT FFR negative.  We opted for medical therapy.         Review of Systems   Constitutional: Negative for diaphoresis, fever, weight gain and weight loss.   Eyes:  Negative for visual disturbance.   Cardiovascular:  Negative for chest pain, claudication, dyspnea on exertion, irregular heartbeat, leg swelling, near-syncope, orthopnea, palpitations, paroxysmal nocturnal dyspnea and syncope.   Respiratory:  Negative for cough, shortness of breath and wheezing.    Musculoskeletal:  Negative for muscle weakness and myalgias.   Neurological:  Negative for dizziness and weakness.   All other systems reviewed and are negative.       Past Medical History:   Diagnosis Date    ASVD (arteriosclerotic vascular disease)     Hyperlipidemia     Hypertension     PVC (premature ventricular contraction)         Past Surgical History:   Procedure Laterality Date    OTHER SURGICAL HISTORY  07/02/2019    Appendectomy    OTHER SURGICAL HISTORY  07/02/2019    Knee surgery    OTHER SURGICAL HISTORY  08/20/2019    Cataract surgery        Social History     Socioeconomic History    Marital status:      Spouse name: Not on file    Number of children: Not on file    Years of education: Not on file    Highest education level: Not on file   Occupational History    Not on file   Tobacco Use    Smoking status: Former     Types: Cigarettes    Smokeless tobacco: Not on file   Substance and Sexual Activity    Alcohol use: Yes      Alcohol/week: 7.0 standard drinks of alcohol     Types: 5 Cans of beer, 2 Standard drinks or equivalent per week    Drug use: Not Currently    Sexual activity: Defer   Other Topics Concern    Not on file   Social History Narrative    Not on file     Social Drivers of Health     Financial Resource Strain: Not on file   Food Insecurity: Not on file   Transportation Needs: Not on file   Physical Activity: Not on file   Stress: Not on file   Social Connections: Not on file   Intimate Partner Violence: Not on file   Housing Stability: Not on file        Family History   Problem Relation Name Age of Onset    Other (Old Age) Mother      Heart disease Father          OBJECTIVE:    Vitals:    04/15/25 1442   BP: 138/66   Pulse: 74   SpO2: 95%        Vitals reviewed.   Constitutional:       Appearance: Normal and healthy appearance. Not in distress.   Pulmonary:      Effort: Pulmonary effort is normal.      Breath sounds: Normal breath sounds.   Cardiovascular:      Normal rate. Regular rhythm. Normal S1. Normal S2.       Murmurs: There is no murmur.      No gallop.  No click.   Pulses:     Intact distal pulses.   Edema:     Peripheral edema absent.   Skin:     General: Skin is warm and dry.   Neurological:      General: No focal deficit present.          Lab Review:   Lab Results   Component Value Date     09/13/2024    K 4.1 09/13/2024     09/13/2024    CO2 29 09/13/2024    BUN 12 09/13/2024    CREATININE 1.12 09/13/2024    GLUCOSE 86 09/13/2024    CALCIUM 9.1 09/13/2024     Lab Results   Component Value Date    CHOL 119 (L) 12/02/2020    TRIG 92 12/02/2020    HDL 61 12/02/2020       Lab Results   Component Value Date    LDLCALC 40 (L) 12/02/2020        ASVD (arteriosclerotic vascular disease)  Stable without angina. Continue asa, high potency statin. Lipids checked with his PCP, goal LDL is <70.     Hypertension  Controlled. Continue losartan. Advised to check at home 2-3x/week. Lifestyle modifications were  discussed. I advocated for mediterranean and plant based eating. We also discussed exercise. 150 minutes a week of moderate intensity exercise is recommended per our ACC/AHA guidelines

## 2025-06-09 ENCOUNTER — APPOINTMENT (OUTPATIENT)
Dept: NEUROLOGY | Facility: CLINIC | Age: 75
End: 2025-06-09
Payer: COMMERCIAL

## 2025-06-09 VITALS
SYSTOLIC BLOOD PRESSURE: 154 MMHG | DIASTOLIC BLOOD PRESSURE: 78 MMHG | HEART RATE: 71 BPM | RESPIRATION RATE: 16 BRPM | WEIGHT: 210 LBS | BODY MASS INDEX: 31.93 KG/M2

## 2025-06-09 DIAGNOSIS — G20.A1 PARKINSON'S DISEASE WITHOUT DYSKINESIA OR FLUCTUATING MANIFESTATIONS: Primary | ICD-10-CM

## 2025-06-09 PROCEDURE — 3077F SYST BP >= 140 MM HG: CPT | Performed by: PSYCHIATRY & NEUROLOGY

## 2025-06-09 PROCEDURE — 3078F DIAST BP <80 MM HG: CPT | Performed by: PSYCHIATRY & NEUROLOGY

## 2025-06-09 PROCEDURE — 1160F RVW MEDS BY RX/DR IN RCRD: CPT | Performed by: PSYCHIATRY & NEUROLOGY

## 2025-06-09 PROCEDURE — 99205 OFFICE O/P NEW HI 60 MIN: CPT | Performed by: PSYCHIATRY & NEUROLOGY

## 2025-06-09 PROCEDURE — 1159F MED LIST DOCD IN RCRD: CPT | Performed by: PSYCHIATRY & NEUROLOGY

## 2025-06-09 ASSESSMENT — UNIFIED PARKINSONS DISEASE RATING SCALE (UPDRS)
SPONTANEITY_OF_MOVEMENT: 1
POSTURAL_STABILITY: 0
AMPLITUDE_LUE: 2
AMPLITUDE_RLE: 0
KINETIC_TREMOR_LEFTHAND: 2
HANDMOVEMENTS_RIGHT: 1
KINETIC_TREMOR_RIGHTHAND: 1
DYSKINESIAS_PRESENT: NO
AMPLITUDE_LIP_JAW: 0
POSTURAL_TREMOR_RIGHTHAND: 0
RIGIDITY_RUE: 2
LEVODOPA: NO
AMPLITUDE_RUE: 0
RIGIDITY_RLE: 1
FINGER_TAPPING_LEFT: 2
LEG_AGILITY_RIGHT: 0
CONSTANCY_TREMOR_ATREST: 2
PARKINSONS_MEDS: NO
PRONATION_SUPINATION_RIGHT: 0
SPEECH: 1
FACIAL_EXPRESSION: 1
RIGIDITY_NECK: 2
TOETAPPING_RIGHT: 0
LEG_AGILITY_LEFT: 1
FREEZING_GAIT: 0
PRONATION_SUPINATION_LEFT: 0
POSTURAL_TREMOR_LEFTHAND: 1
RIGIDITY_LUE: 1
POSTURE: 1
TOTAL_SCORE: 28
CHAIR_RISING_SCALE: 1
GAIT: 1
TOETAPPING_LEFT: 1
RIGIDITY_LLE: 1
HOEHN_YAHR: 1
AMPLITUDE_LLE: 0
FINGER_TAPPING_RIGHT: 1

## 2025-06-09 ASSESSMENT — ENCOUNTER SYMPTOMS
LOSS OF SENSATION IN FEET: 0
OCCASIONAL FEELINGS OF UNSTEADINESS: 0
DEPRESSION: 0

## 2025-06-09 ASSESSMENT — PATIENT HEALTH QUESTIONNAIRE - PHQ9
2. FEELING DOWN, DEPRESSED OR HOPELESS: NOT AT ALL
SUM OF ALL RESPONSES TO PHQ9 QUESTIONS 1 AND 2: 0
SUM OF ALL RESPONSES TO PHQ9 QUESTIONS 1 AND 2: 0
1. LITTLE INTEREST OR PLEASURE IN DOING THINGS: NOT AT ALL
1. LITTLE INTEREST OR PLEASURE IN DOING THINGS: NOT AT ALL
2. FEELING DOWN, DEPRESSED OR HOPELESS: NOT AT ALL

## 2025-06-09 NOTE — PATIENT INSTRUCTIONS
Possible Parkinson's disease with positive DATscan  --we discussed PD and possible treatments  --[employer] or other exercise program  --We discussed clinical research studies  --follow up with Dr. Benjamin
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
## 2025-09-08 ENCOUNTER — APPOINTMENT (OUTPATIENT)
Dept: NEUROLOGY | Facility: CLINIC | Age: 75
End: 2025-09-08
Payer: COMMERCIAL